# Patient Record
Sex: FEMALE | Race: WHITE | NOT HISPANIC OR LATINO | URBAN - METROPOLITAN AREA
[De-identification: names, ages, dates, MRNs, and addresses within clinical notes are randomized per-mention and may not be internally consistent; named-entity substitution may affect disease eponyms.]

---

## 2017-11-16 ENCOUNTER — ALLSCRIPTS OFFICE VISIT (OUTPATIENT)
Dept: OTHER | Facility: OTHER | Age: 52
End: 2017-11-16

## 2017-11-16 ENCOUNTER — GENERIC CONVERSION - ENCOUNTER (OUTPATIENT)
Dept: OTHER | Facility: OTHER | Age: 52
End: 2017-11-16

## 2017-11-17 DIAGNOSIS — F33.9 RECURRENT MAJOR DEPRESSIVE DISORDER (HCC): ICD-10-CM

## 2017-11-17 DIAGNOSIS — Z00.00 ENCOUNTER FOR GENERAL ADULT MEDICAL EXAMINATION WITHOUT ABNORMAL FINDINGS: ICD-10-CM

## 2017-11-17 DIAGNOSIS — Z12.11 ENCOUNTER FOR SCREENING FOR MALIGNANT NEOPLASM OF COLON: ICD-10-CM

## 2017-11-17 DIAGNOSIS — R63.5 ABNORMAL WEIGHT GAIN: ICD-10-CM

## 2017-12-06 ENCOUNTER — GENERIC CONVERSION - ENCOUNTER (OUTPATIENT)
Dept: OTHER | Facility: OTHER | Age: 52
End: 2017-12-06

## 2018-01-13 NOTE — PROGRESS NOTES
Assessment    1  Abnormal weight gain (783 1) (R63 5)   2  Encounter for preventive health examination (V70 0) (Z00 00)   3  Seasonal affective disorder (296 99) (F33 9)   4  Colon cancer screening (V76 51) (Z12 11)    Discussion/Summary  health maintenance visit cervical cancer screening is current Breast cancer screening: mammogram has been ordered  Colorectal cancer screening: fecal occult blood testing supplies were given and fecal occult blood testing is needed every year  Advice and education were given regarding vitamin D supplements  Patient discussion: discussed with the patient  Check labs, will call with results, stool test ordered  add vitamin d D 2000 IU per day  Possible side effects of new medications were reviewed with the patient/guardian today  The treatment plan was reviewed with the patient/guardian  The patient/guardian understands and agrees with the treatment plan      Chief Complaint  patient presented here for physical      History of Present Illness  , Adult Female: The patient is being seen for a health maintenance evaluation  General Health: The patient's health since the last visit is described as good  She has regular dental visits  She complains of vision problems  Vision care includes wearing soft contact lenses  Reproductive health: the patient is postmenopausal    Screening:   HPI: wants to lose weight, went back to weight watchers  living situation makes it difficult to prepare her own meals  not exercising right now  goal weight 130's  working 7 days a week to get back on track  since last visit, had a lot of stress without the help of meds  concern for seasonal affective disorder  gets by with 5-6 hours sleep  Review of Systems    Constitutional: No fever, no chills, feels well, no tiredness, no recent weight gain or weight loss  Eyes: No complaints of eye pain, no red eyes, no eyesight problems, no discharge, no dry eyes, no itching of eyes     ENT: no complaints of earache, no loss of hearing, no nose bleeds, no nasal discharge, no sore throat, no hoarseness  Cardiovascular: No complaints of slow heart rate, no fast heart rate, no chest pain, no palpitations, no leg claudication, no lower extremity edema  Respiratory: No complaints of shortness of breath, no wheezing, no cough, no SOB on exertion, no orthopnea, no PND  Gastrointestinal: No complaints of abdominal pain, no constipation, no nausea or vomiting, no diarrhea, no bloody stools  Genitourinary: No complaints of dysuria, no incontinence, no pelvic pain, no dysmenorrhea, no vaginal discharge or bleeding  Musculoskeletal: No complaints of arthralgias, no myalgias, no joint swelling or stiffness, no limb pain or swelling  Neurological: No complaints of headache, no confusion, no convulsions, no numbness, no dizziness or fainting, no tingling, no limb weakness, no difficulty walking  Hematologic/Lymphatic: No complaints of swollen glands, no swollen glands in the neck, does not bleed easily, does not bruise easily  ROS reviewed  Active Problems    1  Depression with anxiety (300 4) (F41 8)   2  Encounter for screening mammogram for breast cancer (V76 12) (Z12 31)   3   Need for hepatitis A vaccination (V05 3) (Z23)    Past Medical History    · History of Acute frontal sinusitis (461 1) (J01 10)   · History of Adenomyosis (617 0) (N80 0)   · History of allergic rhinitis (V12 69) (Z87 09)   · History of anemia (V12 3) (Z86 2)   · History of Impingement syndrome of right shoulder (726 2) (M75 41)   · History of Strain of right biceps, initial encounter (840 8) (D41 648C)   · History of Subdeltoid bursitis of right shoulder joint (726 19) (M75 51)   · History of Symptomatic menopausal or female climacteric states (627 2) (N95 1)   · History of Tendinopathy of right biceps tendon (727 9) (N45 958)    Surgical History    · History of Hysteroscopy With Endometrial Ablation   · History of Pilonidal Cyst Resection   · History of Total Abdominal Hysterectomy    Family History  Mother    · Family history of Generalized Anxiety Disorder  Son    · Family history of Asperger Syndrome  Sister    · Family history of Generalized Anxiety Disorder    Social History    · Being A Social Drinker   · Never A Smoker   · Uses Safety Equipment - Seatbelts    Current Meds   1  No Reported Medications Recorded   2  No Reported Medications Recorded    Allergies    1  No Known Drug Allergies    Physical Exam    Constitutional   General appearance: No acute distress, well appearing and well nourished  Procedure    Procedure: Visual Acuity Test    Indication: routine screening  Results: 20/40 in both eyes with corrective device, 20/30 in the right eye with corrective device normal in both eyes  Has mono vision contacts   Color vision was and the results were normal      Procedure: Hearing Acuity Test    Indication: Routine screeing  Audiometry: Normal bilaterally  Hearing in the right ear: 20 decibals at 500 hertz, 20 decibals at 1000 hertz and 20 decibals at 2000 hertz  Hearing in the left ear: 40 decibals at 500 hertz, 40 decibals at 1000 hertz and 40 decibals at 2000 hertz         Signatures   Electronically signed by : AARON White ; Nov 16 2017 11:02AM EST                       (Author)

## 2018-01-22 VITALS
RESPIRATION RATE: 16 BRPM | DIASTOLIC BLOOD PRESSURE: 66 MMHG | WEIGHT: 159.38 LBS | BODY MASS INDEX: 26.55 KG/M2 | HEIGHT: 65 IN | TEMPERATURE: 97.9 F | SYSTOLIC BLOOD PRESSURE: 108 MMHG | OXYGEN SATURATION: 98 % | HEART RATE: 68 BPM

## 2018-01-23 NOTE — RESULT NOTES
Verified Results  (1) CBC/PLT/DIFF [id number] 11:29AM [facility]     Test Name Result Flag Reference   WBC COUNT 4 4     RBC COUNT 4 37     HEMOGLOBIN 13 2     HEMATOCRIT 39 2     MCV 90     MCH 30 3     MCHC 33 8     RDW 12 9     MPV 10 8     PLATELET COUNT 519        Summary / No summary entered :      No summary entered   Documents attached :      [address]  Work - [place]; Enc: [id number] - Image Encounter -      Analy Riccardo - (Family Medicine) (Additional Information      Document)  (1) LIPID PANEL, FASTING [id number] 11:33AM Analy [facility]     Test Name Result Flag Reference   CHOLESTEROL 187     HDL,DIRECT 64     LDL CHOLESTEROL CALCULATED 101     TRIGLYCERIDES 110     TCHOL/HDL RATIO 2 92        Summary / No summary entered :      No summary entered   Documents attached :      [facility] Work - Analy Gu; Enc: [id number] - Image Encounter -      Analy Riccardo - (Family Medicine) (Additional Information      Document)  (1) TSH [id number] 11:32AM Analy Riccardo     Test Name Result Flag Reference   TSH 1 88        Summary / No summary entered :      No summary entered   Documents attached :      [address] [facility] Work - [place]; Enc: [id number] - Image Encounter -      [place] [facility] - (Family Medicine) (Additional Information      Document)  (1) COMPREHENSIVE METABOLIC PANEL [id number] 38:46XA [place] Riccard[facility]     Test Name Result Flag Reference   GLUCOSE,RANDM 89     SODIUM 141     POTASSIUM 4 0     CHLORIDE 106     CARBON DIOXIDE 29     ANION GAP (CALC) 6     BLOOD UREA NITROGEN 16     CREATININE 0 74     CALCIUM 9 0     BILI, TOTAL 0 6     ALK PHOSPHATAS 86     ALT (SGPT) 17     AST(SGOT) 7     ALBUMIN 4 0     TOTAL PROTEIN 0 6        Summary / No summary entered :      No summary entered   Documents attached :      [address] [facility] Work - Analy Riccardo; Marcus Dudley: [id number] - Image Encounter -      Analy Riccardo - (Family Medicine) (Additional Information      Document)